# Patient Record
Sex: FEMALE | Race: WHITE | NOT HISPANIC OR LATINO | URBAN - METROPOLITAN AREA
[De-identification: names, ages, dates, MRNs, and addresses within clinical notes are randomized per-mention and may not be internally consistent; named-entity substitution may affect disease eponyms.]

---

## 2024-10-11 ENCOUNTER — INPATIENT (INPATIENT)
Facility: HOSPITAL | Age: 79
LOS: 1 days | Discharge: HOME | DRG: 312 | End: 2024-10-13
Attending: INTERNAL MEDICINE | Admitting: INTERNAL MEDICINE
Payer: MEDICARE

## 2024-10-11 VITALS
WEIGHT: 158.95 LBS | SYSTOLIC BLOOD PRESSURE: 164 MMHG | TEMPERATURE: 98 F | HEART RATE: 70 BPM | OXYGEN SATURATION: 95 % | HEIGHT: 61 IN | RESPIRATION RATE: 16 BRPM | DIASTOLIC BLOOD PRESSURE: 78 MMHG

## 2024-10-11 DIAGNOSIS — R55 SYNCOPE AND COLLAPSE: ICD-10-CM

## 2024-10-11 LAB
ALBUMIN SERPL ELPH-MCNC: 3.5 G/DL — SIGNIFICANT CHANGE UP (ref 3.5–5)
ALP SERPL-CCNC: 41 U/L — SIGNIFICANT CHANGE UP (ref 40–120)
ALT FLD-CCNC: 26 U/L DA — SIGNIFICANT CHANGE UP (ref 10–60)
ANION GAP SERPL CALC-SCNC: 5 MMOL/L — SIGNIFICANT CHANGE UP (ref 5–17)
AST SERPL-CCNC: 30 U/L — SIGNIFICANT CHANGE UP (ref 10–40)
BASOPHILS # BLD AUTO: 0.01 K/UL — SIGNIFICANT CHANGE UP (ref 0–0.2)
BASOPHILS NFR BLD AUTO: 0.2 % — SIGNIFICANT CHANGE UP (ref 0–2)
BILIRUB SERPL-MCNC: 0.3 MG/DL — SIGNIFICANT CHANGE UP (ref 0.2–1.2)
BUN SERPL-MCNC: 28 MG/DL — HIGH (ref 7–18)
CALCIUM SERPL-MCNC: 9.8 MG/DL — SIGNIFICANT CHANGE UP (ref 8.4–10.5)
CHLORIDE SERPL-SCNC: 108 MMOL/L — SIGNIFICANT CHANGE UP (ref 96–108)
CO2 SERPL-SCNC: 28 MMOL/L — SIGNIFICANT CHANGE UP (ref 22–31)
CREAT SERPL-MCNC: 1.54 MG/DL — HIGH (ref 0.5–1.3)
EGFR: 34 ML/MIN/1.73M2 — LOW
EOSINOPHIL # BLD AUTO: 0.2 K/UL — SIGNIFICANT CHANGE UP (ref 0–0.5)
EOSINOPHIL NFR BLD AUTO: 3.4 % — SIGNIFICANT CHANGE UP (ref 0–6)
GLUCOSE SERPL-MCNC: 134 MG/DL — HIGH (ref 70–99)
HCT VFR BLD CALC: 36.6 % — SIGNIFICANT CHANGE UP (ref 34.5–45)
HGB BLD-MCNC: 11.8 G/DL — SIGNIFICANT CHANGE UP (ref 11.5–15.5)
IMM GRANULOCYTES NFR BLD AUTO: 0.3 % — SIGNIFICANT CHANGE UP (ref 0–0.9)
LYMPHOCYTES # BLD AUTO: 1.37 K/UL — SIGNIFICANT CHANGE UP (ref 1–3.3)
LYMPHOCYTES # BLD AUTO: 23.3 % — SIGNIFICANT CHANGE UP (ref 13–44)
MCHC RBC-ENTMCNC: 30.6 PG — SIGNIFICANT CHANGE UP (ref 27–34)
MCHC RBC-ENTMCNC: 32.2 GM/DL — SIGNIFICANT CHANGE UP (ref 32–36)
MCV RBC AUTO: 95.1 FL — SIGNIFICANT CHANGE UP (ref 80–100)
MONOCYTES # BLD AUTO: 0.56 K/UL — SIGNIFICANT CHANGE UP (ref 0–0.9)
MONOCYTES NFR BLD AUTO: 9.5 % — SIGNIFICANT CHANGE UP (ref 2–14)
NEUTROPHILS # BLD AUTO: 3.71 K/UL — SIGNIFICANT CHANGE UP (ref 1.8–7.4)
NEUTROPHILS NFR BLD AUTO: 63.3 % — SIGNIFICANT CHANGE UP (ref 43–77)
NRBC # BLD: 0 /100 WBCS — SIGNIFICANT CHANGE UP (ref 0–0)
PLATELET # BLD AUTO: 206 K/UL — SIGNIFICANT CHANGE UP (ref 150–400)
POTASSIUM SERPL-MCNC: 4.6 MMOL/L — SIGNIFICANT CHANGE UP (ref 3.5–5.3)
POTASSIUM SERPL-SCNC: 4.6 MMOL/L — SIGNIFICANT CHANGE UP (ref 3.5–5.3)
PROT SERPL-MCNC: 8.2 G/DL — SIGNIFICANT CHANGE UP (ref 6–8.3)
RBC # BLD: 3.85 M/UL — SIGNIFICANT CHANGE UP (ref 3.8–5.2)
RBC # FLD: 13.5 % — SIGNIFICANT CHANGE UP (ref 10.3–14.5)
SODIUM SERPL-SCNC: 141 MMOL/L — SIGNIFICANT CHANGE UP (ref 135–145)
TROPONIN I, HIGH SENSITIVITY RESULT: 11.5 NG/L — SIGNIFICANT CHANGE UP
WBC # BLD: 5.87 K/UL — SIGNIFICANT CHANGE UP (ref 3.8–10.5)
WBC # FLD AUTO: 5.87 K/UL — SIGNIFICANT CHANGE UP (ref 3.8–10.5)

## 2024-10-11 PROCEDURE — 12011 RPR F/E/E/N/L/M 2.5 CM/<: CPT

## 2024-10-11 PROCEDURE — 99285 EMERGENCY DEPT VISIT HI MDM: CPT | Mod: 25

## 2024-10-11 PROCEDURE — 73110 X-RAY EXAM OF WRIST: CPT | Mod: 26,50

## 2024-10-11 PROCEDURE — 70486 CT MAXILLOFACIAL W/O DYE: CPT | Mod: 26,MC

## 2024-10-11 PROCEDURE — 71045 X-RAY EXAM CHEST 1 VIEW: CPT | Mod: 26

## 2024-10-11 PROCEDURE — 70450 CT HEAD/BRAIN W/O DYE: CPT | Mod: 26,MC

## 2024-10-11 PROCEDURE — 93010 ELECTROCARDIOGRAM REPORT: CPT

## 2024-10-11 PROCEDURE — 99223 1ST HOSP IP/OBS HIGH 75: CPT

## 2024-10-11 NOTE — ED ADULT NURSE NOTE - OBJECTIVE STATEMENT
Patient presents to ED c/o pain and reporting fall on sidewalk that was uneven obtaining a laceration on bridge of her nose. Denies LOC

## 2024-10-11 NOTE — H&P ADULT - PROBLEM SELECTOR PLAN 2
Pt w/ SCr 1.54 on admission  -repeat sCr 1.32   -baseline SCr -unknown  -possibly pre-renal/medication induced from doubling dose of omlesartan  -F/U Urine Lytes, calculate FeNa  -IVF for now, follow BMP daily

## 2024-10-11 NOTE — H&P ADULT - NSHPPHYSICALEXAM_GEN_ALL_CORE
LOS: 1d    VITALS:   T(C): 36.5 (10-12-24 @ 05:20), Max: 36.8 (10-11-24 @ 23:54)  HR: 62 (10-12-24 @ 05:20) (62 - 70)  BP: 145/60 (10-12-24 @ 05:20) (145/60 - 164/78)  RR: 18 (10-12-24 @ 05:20) (16 - 18)  SpO2: 94% (10-12-24 @ 05:20) (94% - 95%)    GENERAL: NAD, lying in bed comfortably  HEAD:  Traumatic to bridge of nose s/p laceration repair with surrounding ecchymosis and edema, Normocephalic  EYES: EOMI, PERRLA, conjunctiva and sclera clear  ENT: Moist mucous membranes, dried blood in b/l nares  NECK: Supple, No JVD  CHEST/LUNG: Clear to auscultation bilaterally; No rales, rhonchi, wheezing, or rubs. Unlabored respirations  HEART: Regular rate and rhythm; No murmurs, rubs, or gallops  ABDOMEN: BSx4; Soft, nontender, nondistended  EXTREMITIES:  2+ Peripheral Pulses, brisk capillary refill. No clubbing, cyanosis, or edema  NERVOUS SYSTEM:  A&Ox3, no focal deficits   SKIN: No rashes or lesions

## 2024-10-11 NOTE — H&P ADULT - ASSESSMENT
78F with PMH arthritis, HTN, DM, GERD, IBS-C, hypthyroid, HLD, and depression presenting after a syncopal episode resulting in facial injury walking down the street. Admitted to telemetry for syncopal work-up.

## 2024-10-11 NOTE — ED PROVIDER NOTE - PROGRESS NOTE DETAILS
ATTG: : CT with no acute findings.  Episode concerning for syncope. will admit to the hospital for further care.

## 2024-10-11 NOTE — ED PROVIDER NOTE - CLINICAL SUMMARY MEDICAL DECISION MAKING FREE TEXT BOX
ATTG: : fall unclear etiology as patient now stating that she did not trip and fall and instead had chest pain over the last 3 days, concern include but not limited to syncope / cardiac / mechanical fall will check ct head, ct max facial, labs, xray chest, xray of wrists, pain medication and re eval. refusing tetanus since she had allergic reaction in the past.

## 2024-10-11 NOTE — H&P ADULT - NSICDXPASTMEDICALHX_GEN_ALL_CORE_FT
PAST MEDICAL HISTORY:  HTN (hypertension)      PAST MEDICAL HISTORY:  Depression, unspecified     Diabetes mellitus     HLD (hyperlipidemia)     HTN (hypertension)     Hypothyroidism

## 2024-10-11 NOTE — ED ADULT NURSE NOTE - NSFALLRISKINTERV_ED_ALL_ED

## 2024-10-11 NOTE — H&P ADULT - PROBLEM SELECTOR PLAN 4
hx of HTN on omlesartan  -patient admits to taking med twice a day without consulting MD  -c/w home med once a day  -monitor BP

## 2024-10-11 NOTE — ED PROVIDER NOTE - OBJECTIVE STATEMENT
78-year-old female with PMHx of HTN, presents to the emergency department for fall with laceration to her nose.  Patient states that she was walking on a flat street and then suddenly fell.  Does not recall exactly what happened.  Denies any pre or post dizziness or chest pain.  She does admit to having some chest pain over the last 3 days. the chest pain resolved prior to the episode.  In the emergency department she complains of pain on both her wrists.  She denies being on any anticoagulation and only takes aspirin daily.
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
## 2024-10-11 NOTE — H&P ADULT - PROBLEM SELECTOR PLAN 1
p/w syncope with collapse,  -likely 2/2 to overuse of home BP med however concern for unstable arrythmia in s/o minimal prodrome  -Troponin WNL  -f/u orthostatic bp  -f/u echo w bubble study  -EKG shows sinus rhythm with 1st degree AVB  -f/u UA   -CT head: negative  -Cardio consult in AM  -PT consult

## 2024-10-11 NOTE — ED PROVIDER NOTE - PHYSICAL EXAMINATION
Primary Survey   A - airway intact  B - bilateral breath sounds and good chest rise  C -palpable pulses in all extremities  D - GCS Motor:  6/6, Verbal 5 /5, eyes 4/4 total =15  Exposure obtained      Secondary Survey:  Gen:  No respiratory Distress  /no distress from pain  HEENT: pupils 3 mm reactive to light equally,   EOMI  NO Raccoon Eyes/ Bhandari Sign/ Neck: C- spine non tender. no step off or deformity. tm clear. abrasion / laceration to nasal bridge approx 1.5 cm. with edema. no intranasal bleeding or hematoma  Lungs: breath sounds:  b/l   CVS: S1S2,    Distal Pulses: 2+ Radial and DP b/l   Abd: soft non tender no distention  Extremities: no gross deformity. ttp over both wrists.  no ecchymosis. full range of motion. sensation intact.   MSK: strength: 5/5 b/l upper and lower ext, moving all ext spontaneously  Back: no midline tend or step off  Neuro: aaox3 no foal deficits.

## 2024-10-11 NOTE — H&P ADULT - PROBLEM SELECTOR PLAN 3
p/w laceration to bridge of nose and evidence of epistaxis  -s/p laceration repair in ED  -Hbg stable (11.1)  -CT head/maxillofacial: unremarkable  -Local wound care  -Pain control: Tylenol 1g q8 standing p/w laceration to bridge of nose and evidence of epistaxis  -s/p laceration repair in ED  -Hbg stable (11.1)  -CT head/maxillofacial: unremarkable  -Local wound care  -Pain control: Tylenol 1g q8 standing  -PT consult

## 2024-10-11 NOTE — H&P ADULT - ATTENDING COMMENTS
Vital Signs Last 24 Hrs  T(C): 36.5 (12 Oct 2024 05:20), Max: 36.8 (11 Oct 2024 23:54)  T(F): 97.7 (12 Oct 2024 05:20), Max: 98.2 (11 Oct 2024 23:54)  HR: 62 (12 Oct 2024 05:20) (62 - 70)  BP: 145/60 (12 Oct 2024 05:20) (145/60 - 164/78)  RR: 18 (12 Oct 2024 05:20) (16 - 18)  SpO2: 94% (12 Oct 2024 05:20) (94% - 95%)  Parameters below as of 12 Oct 2024 05:20  Patient On (Oxygen Delivery Method): room air    Orthostatic VS    10-12-24 @ 02:50  Standing BP:   Orthostatic BP (Standing Systolic): 132/Orthostatic BP (Standing Diastolic (mm Hg)): 80 HR: Orthostatic Pulse (Heart Rate (beats/min)): 74  Site: Orthostatic BP/Pulse (Site): upper left arm   Mode: Orthostatic BP/ Pulse (Mode): electronic    10-12-24 @ 02:45  Sitting BP:   Orthostatic BP (Sitting Systolic): 138/Orthostatic BP (Sitting Diastolic (mm Hg)): 72 HR: Orthostatic Pulse (Heart Rate (beats/min)): 68  Standing BP: --/-- HR: --  Site: Orthostatic BP/Pulse (Site): upper left arm   Mode: Orthostatic BP/ Pulse (Mode): electronic    10-12-24 @ 02:40  Lying BP:   Orthostatic BP (Lying Systolic): 159/Orthostatic BP (Lying Diastolic (mm Hg)): 72 HR: Orthostatic Pulse (Heart Rate (beats/min)): 71   Site: Orthostatic BP/Pulse (Site): upper left arm   Mode: Orthostatic BP/ Pulse (Mode): electronic      Labs   unremarkable except  BUN/Cr - 28/1.54 --> 23/1.32    CXR unremarkable    CT head/ CT MF   - unremarkable     Impression   78 year old lady with hx of HTN brought to the ED after a fall during which she fell on her face. She does not recollect the event preceding the fall and cannot rule out loss of consciousness.   OF note, preceding hx of intermittent dizziness and left chest pain/pressure. No work up before now.  Orthostatic V/S are +ve   Will admit for syncope and ACS work up     Plan   Admit to telemetry   gentle IVF hydration and repeat orthostatic VS post hydration  Serial trop; EKG only shows 1st degree AVB  Head CT as above   Obtain ECHO this AM   Cardiology consult   Consider stress test  repeat chem after hydration  PT evaluation  Plans for chronic med problems as above  med reconciliation and resume home meds

## 2024-10-11 NOTE — ED PROVIDER NOTE - NS ED ATTENDING STATEMENT MOD
How Severe Are Your Spot(S)?: mild Have Your Spot(S) Been Treated In The Past?: has not been treated Hpi Title: Evaluation of Skin Lesions Attending Only

## 2024-10-12 DIAGNOSIS — E78.5 HYPERLIPIDEMIA, UNSPECIFIED: ICD-10-CM

## 2024-10-12 DIAGNOSIS — R55 SYNCOPE AND COLLAPSE: ICD-10-CM

## 2024-10-12 DIAGNOSIS — Z29.9 ENCOUNTER FOR PROPHYLACTIC MEASURES, UNSPECIFIED: ICD-10-CM

## 2024-10-12 DIAGNOSIS — I10 ESSENTIAL (PRIMARY) HYPERTENSION: ICD-10-CM

## 2024-10-12 DIAGNOSIS — E11.9 TYPE 2 DIABETES MELLITUS WITHOUT COMPLICATIONS: ICD-10-CM

## 2024-10-12 DIAGNOSIS — N17.9 ACUTE KIDNEY FAILURE, UNSPECIFIED: ICD-10-CM

## 2024-10-12 DIAGNOSIS — M19.90 UNSPECIFIED OSTEOARTHRITIS, UNSPECIFIED SITE: ICD-10-CM
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
## 2024-10-12 DIAGNOSIS — E03.9 HYPOTHYROIDISM, UNSPECIFIED: ICD-10-CM

## 2024-10-12 LAB
ANION GAP SERPL CALC-SCNC: 7 MMOL/L — SIGNIFICANT CHANGE UP (ref 5–17)
APPEARANCE UR: CLEAR — SIGNIFICANT CHANGE UP
BACTERIA # UR AUTO: ABNORMAL /HPF
BILIRUB UR-MCNC: NEGATIVE — SIGNIFICANT CHANGE UP
BUN SERPL-MCNC: 23 MG/DL — HIGH (ref 7–18)
CALCIUM SERPL-MCNC: 9.3 MG/DL — SIGNIFICANT CHANGE UP (ref 8.4–10.5)
CHLORIDE SERPL-SCNC: 111 MMOL/L — HIGH (ref 96–108)
CO2 SERPL-SCNC: 28 MMOL/L — SIGNIFICANT CHANGE UP (ref 22–31)
COLOR SPEC: YELLOW — SIGNIFICANT CHANGE UP
COMMENT - URINE: SIGNIFICANT CHANGE UP
CREAT ?TM UR-MCNC: 33 MG/DL — SIGNIFICANT CHANGE UP
CREAT SERPL-MCNC: 1.32 MG/DL — HIGH (ref 0.5–1.3)
DIFF PNL FLD: NEGATIVE — SIGNIFICANT CHANGE UP
EGFR: 41 ML/MIN/1.73M2 — LOW
EPI CELLS # UR: PRESENT
GLUCOSE BLDC GLUCOMTR-MCNC: 101 MG/DL — HIGH (ref 70–99)
GLUCOSE BLDC GLUCOMTR-MCNC: 116 MG/DL — HIGH (ref 70–99)
GLUCOSE BLDC GLUCOMTR-MCNC: 89 MG/DL — SIGNIFICANT CHANGE UP (ref 70–99)
GLUCOSE BLDC GLUCOMTR-MCNC: 98 MG/DL — SIGNIFICANT CHANGE UP (ref 70–99)
GLUCOSE SERPL-MCNC: 96 MG/DL — SIGNIFICANT CHANGE UP (ref 70–99)
GLUCOSE UR QL: NEGATIVE MG/DL — SIGNIFICANT CHANGE UP
HCT VFR BLD CALC: 34.1 % — LOW (ref 34.5–45)
HGB BLD-MCNC: 11.1 G/DL — LOW (ref 11.5–15.5)
KETONES UR-MCNC: NEGATIVE MG/DL — SIGNIFICANT CHANGE UP
LEUKOCYTE ESTERASE UR-ACNC: NEGATIVE — SIGNIFICANT CHANGE UP
MAGNESIUM SERPL-MCNC: 1.7 MG/DL — SIGNIFICANT CHANGE UP (ref 1.6–2.6)
MCHC RBC-ENTMCNC: 31 PG — SIGNIFICANT CHANGE UP (ref 27–34)
MCHC RBC-ENTMCNC: 32.6 GM/DL — SIGNIFICANT CHANGE UP (ref 32–36)
MCV RBC AUTO: 95.3 FL — SIGNIFICANT CHANGE UP (ref 80–100)
NITRITE UR-MCNC: NEGATIVE — SIGNIFICANT CHANGE UP
NRBC # BLD: 0 /100 WBCS — SIGNIFICANT CHANGE UP (ref 0–0)
PH UR: 6 — SIGNIFICANT CHANGE UP (ref 5–8)
PHOSPHATE SERPL-MCNC: 2.9 MG/DL — SIGNIFICANT CHANGE UP (ref 2.5–4.5)
PLATELET # BLD AUTO: 191 K/UL — SIGNIFICANT CHANGE UP (ref 150–400)
POTASSIUM SERPL-MCNC: 4.2 MMOL/L — SIGNIFICANT CHANGE UP (ref 3.5–5.3)
POTASSIUM SERPL-SCNC: 4.2 MMOL/L — SIGNIFICANT CHANGE UP (ref 3.5–5.3)
PROT ?TM UR-MCNC: 20 MG/DL — HIGH (ref 0–12)
PROT UR-MCNC: ABNORMAL MG/DL
PROT/CREAT UR-RTO: 0.6 RATIO — HIGH (ref 0–0.2)
RBC # BLD: 3.58 M/UL — LOW (ref 3.8–5.2)
RBC # FLD: 13.4 % — SIGNIFICANT CHANGE UP (ref 10.3–14.5)
RBC CASTS # UR COMP ASSIST: 1 /HPF — SIGNIFICANT CHANGE UP (ref 0–4)
SODIUM SERPL-SCNC: 146 MMOL/L — HIGH (ref 135–145)
SODIUM UR-SCNC: 111 MMOL/L — SIGNIFICANT CHANGE UP
SP GR SPEC: 1.01 — SIGNIFICANT CHANGE UP (ref 1–1.03)
UROBILINOGEN FLD QL: 0.2 MG/DL — SIGNIFICANT CHANGE UP (ref 0.2–1)
UUN UR-MCNC: 270 MG/DL — SIGNIFICANT CHANGE UP
WBC # BLD: 5.99 K/UL — SIGNIFICANT CHANGE UP (ref 3.8–10.5)
WBC # FLD AUTO: 5.99 K/UL — SIGNIFICANT CHANGE UP (ref 3.8–10.5)
WBC UR QL: 0 /HPF — SIGNIFICANT CHANGE UP (ref 0–5)

## 2024-10-12 PROCEDURE — 99232 SBSQ HOSP IP/OBS MODERATE 35: CPT | Mod: GC

## 2024-10-12 RX ORDER — INSULIN LISPRO 100/ML
VIAL (ML) SUBCUTANEOUS AT BEDTIME
Refills: 0 | Status: DISCONTINUED | OUTPATIENT
Start: 2024-10-12 | End: 2024-10-13

## 2024-10-12 RX ORDER — SODIUM CHLORIDE 0.9 % (FLUSH) 0.9 %
1000 SYRINGE (ML) INJECTION
Refills: 0 | Status: DISCONTINUED | OUTPATIENT
Start: 2024-10-12 | End: 2024-10-13

## 2024-10-12 RX ORDER — INFLUENZA VIRUS VACCINE 15; 15; 15; 15 UG/.5ML; UG/.5ML; UG/.5ML; UG/.5ML
0.5 SUSPENSION INTRAMUSCULAR ONCE
Refills: 0 | Status: COMPLETED | OUTPATIENT
Start: 2024-10-12 | End: 2024-10-12

## 2024-10-12 RX ORDER — ACETAMINOPHEN 325 MG
650 TABLET ORAL EVERY 6 HOURS
Refills: 0 | Status: DISCONTINUED | OUTPATIENT
Start: 2024-10-12 | End: 2024-10-12

## 2024-10-12 RX ORDER — ROSUVASTATIN CALCIUM 20 MG/1
20 TABLET, COATED ORAL AT BEDTIME
Refills: 0 | Status: DISCONTINUED | OUTPATIENT
Start: 2024-10-12 | End: 2024-10-13

## 2024-10-12 RX ORDER — ACETAMINOPHEN 325 MG
650 TABLET ORAL ONCE
Refills: 0 | Status: COMPLETED | OUTPATIENT
Start: 2024-10-12 | End: 2024-10-12

## 2024-10-12 RX ORDER — ACETAMINOPHEN 325 MG
1000 TABLET ORAL EVERY 8 HOURS
Refills: 0 | Status: DISCONTINUED | OUTPATIENT
Start: 2024-10-12 | End: 2024-10-13

## 2024-10-12 RX ORDER — INSULIN LISPRO 100/ML
VIAL (ML) SUBCUTANEOUS
Refills: 0 | Status: DISCONTINUED | OUTPATIENT
Start: 2024-10-12 | End: 2024-10-13

## 2024-10-12 RX ORDER — DULOXETINE HCL 20 MG
30 CAPSULE,DELAYED RELEASE (ENTERIC COATED) ORAL DAILY
Refills: 0 | Status: DISCONTINUED | OUTPATIENT
Start: 2024-10-12 | End: 2024-10-13

## 2024-10-12 RX ADMIN — Medication 30 MILLIGRAM(S): at 12:15

## 2024-10-12 RX ADMIN — ROSUVASTATIN CALCIUM 20 MILLIGRAM(S): 20 TABLET, COATED ORAL at 22:46

## 2024-10-12 RX ADMIN — Medication 650 MILLIGRAM(S): at 09:13

## 2024-10-12 RX ADMIN — Medication 100 MILLILITER(S): at 06:51

## 2024-10-12 RX ADMIN — Medication 1000 MILLIGRAM(S): at 22:44

## 2024-10-12 RX ADMIN — Medication 650 MILLIGRAM(S): at 10:00

## 2024-10-12 RX ADMIN — Medication 1000 MILLIGRAM(S): at 23:44

## 2024-10-12 NOTE — PATIENT PROFILE ADULT - FALL HARM RISK - HARM RISK INTERVENTIONS
Assistance with ambulation/Assistance OOB with selected safe patient handling equipment/Communicate Risk of Fall with Harm to all staff/Discuss with provider need for PT consult/Monitor gait and stability/Provide patient with walking aids - walker, cane, crutches/Reinforce activity limits and safety measures with patient and family/Sit up slowly, dangle for a short time, stand at bedside before walking/Tailored Fall Risk Interventions/Visual Cue: Yellow wristband and red socks/Bed in lowest position, wheels locked, appropriate side rails in place/Call bell, personal items and telephone in reach/Instruct patient to call for assistance before getting out of bed or chair/Non-slip footwear when patient is out of bed/[place] to call system/Physically safe environment - no spills, clutter or unnecessary equipment/Purposeful Proactive Rounding/Room/bathroom lighting operational, light cord in reach

## 2024-10-12 NOTE — CHART NOTE - NSCHARTNOTEFT_GEN_A_CORE
Vital Signs Last 24 Hrs  T(C): 36.5 (12 Oct 2024 05:20), Max: 36.8 (11 Oct 2024 23:54)  T(F): 97.7 (12 Oct 2024 05:20), Max: 98.2 (11 Oct 2024 23:54)  HR: 62 (12 Oct 2024 05:20) (62 - 70)  BP: 145/60 (12 Oct 2024 05:20) (145/60 - 164/78)  RR: 18 (12 Oct 2024 05:20) (16 - 18)  SpO2: 94% (12 Oct 2024 05:20) (94% - 95%)  Parameters below as of 12 Oct 2024 05:20  Patient On (Oxygen Delivery Method): room air    Orthostatic VS    10-12-24 @ 02:50  Standing BP:   Orthostatic BP (Standing Systolic): 132/Orthostatic BP (Standing Diastolic (mm Hg)): 80 HR: Orthostatic Pulse (Heart Rate (beats/min)): 74  Site: Orthostatic BP/Pulse (Site): upper left arm   Mode: Orthostatic BP/ Pulse (Mode): electronic    10-12-24 @ 02:45  Sitting BP:   Orthostatic BP (Sitting Systolic): 138/Orthostatic BP (Sitting Diastolic (mm Hg)): 72 HR: Orthostatic Pulse (Heart Rate (beats/min)): 68  Standing BP: --/-- HR: --  Site: Orthostatic BP/Pulse (Site): upper left arm   Mode: Orthostatic BP/ Pulse (Mode): electronic    10-12-24 @ 02:40  Lying BP:   Orthostatic BP (Lying Systolic): 159/Orthostatic BP (Lying Diastolic (mm Hg)): 72 HR: Orthostatic Pulse (Heart Rate (beats/min)): 71   Site: Orthostatic BP/Pulse (Site): upper left arm   Mode: Orthostatic BP/ Pulse (Mode): electronic      Labs   unremarkable except  BUN/Cr - 28/1.54 --> 23/1.32    CXR unremarkable    CT head/ CT MF   - unremarkable     Impression   78 year old lady with hx of HTN brought to the ED after a fall during which she fell on her face. She does not recollect the event preceding the fall and cannot rule out loss of consciousness.   OF note, preceding hx of intermittent dizziness and left chest pain/pressure. No work up before now. Vital Signs Last 24 Hrs  T(C): 36.5 (12 Oct 2024 05:20), Max: 36.8 (11 Oct 2024 23:54)  T(F): 97.7 (12 Oct 2024 05:20), Max: 98.2 (11 Oct 2024 23:54)  HR: 62 (12 Oct 2024 05:20) (62 - 70)  BP: 145/60 (12 Oct 2024 05:20) (145/60 - 164/78)  RR: 18 (12 Oct 2024 05:20) (16 - 18)  SpO2: 94% (12 Oct 2024 05:20) (94% - 95%)  Parameters below as of 12 Oct 2024 05:20  Patient On (Oxygen Delivery Method): room air    Orthostatic VS    10-12-24 @ 02:50  Standing BP:   Orthostatic BP (Standing Systolic): 132/Orthostatic BP (Standing Diastolic (mm Hg)): 80 HR: Orthostatic Pulse (Heart Rate (beats/min)): 74  Site: Orthostatic BP/Pulse (Site): upper left arm   Mode: Orthostatic BP/ Pulse (Mode): electronic    10-12-24 @ 02:45  Sitting BP:   Orthostatic BP (Sitting Systolic): 138/Orthostatic BP (Sitting Diastolic (mm Hg)): 72 HR: Orthostatic Pulse (Heart Rate (beats/min)): 68  Standing BP: --/-- HR: --  Site: Orthostatic BP/Pulse (Site): upper left arm   Mode: Orthostatic BP/ Pulse (Mode): electronic    10-12-24 @ 02:40  Lying BP:   Orthostatic BP (Lying Systolic): 159/Orthostatic BP (Lying Diastolic (mm Hg)): 72 HR: Orthostatic Pulse (Heart Rate (beats/min)): 71   Site: Orthostatic BP/Pulse (Site): upper left arm   Mode: Orthostatic BP/ Pulse (Mode): electronic      Labs   unremarkable except  BUN/Cr - 28/1.54 --> 23/1.32    CXR unremarkable    CT head/ CT MF   - unremarkable     Impression   78 year old lady with hx of HTN brought to the ED after a fall during which she fell on her face. She does not recollect the event preceding the fall and cannot rule out loss of consciousness.   OF note, preceding hx of intermittent dizziness and left chest pain/pressure. No work up before now.  Orthostatic V/S are +ve   Will admit for syncope and ACS work up     Plan   Admit to telemetry   gentle IVF hydration and repeat orthostatic VS post hydration  Serial trop; EKG only shows 1st degree AVB  Head CT as above   Obtain ECHO this AM   Cardiology consult   Consider stress test  repeat chem after hydration  PT evaluation  Plans for chronic med problems as above  med reconciliation and resume home meds

## 2024-10-12 NOTE — PROGRESS NOTE ADULT - PROBLEM SELECTOR PLAN 1
p/w syncope with collapse,  -likely 2/2 to overuse of home BP med however concern for unstable arrythmia in s/o minimal prodrome  -Troponin WNL  -f/u orthostatic bp  -f/u echo w bubble study  -EKG shows sinus rhythm with 1st degree AVB  -f/u UA   -CT head: negative  -Cardio consult in AM - Dr. Ag  -PT consult

## 2024-10-12 NOTE — PROGRESS NOTE ADULT - SUBJECTIVE AND OBJECTIVE BOX
PGY-1 Progress Note discussed with attending      PLEASE CONTACT ON CALL TEAM:  - On Call Team (Please refer to Ailyn) FROM 5:00 PM - 8:30PM  - Nightfloat Team FROM 8:30 -7:30 AM    INTERVAL HPI/OVERNIGHT EVENTS: No acute events overnight.  Patient examined at bedside this AM.  Patient denies acute complaints. Denies any current dizziness.       REVIEW OF SYSTEMS:  CONSTITUTIONAL: No fever, weight loss, or fatigue  RESPIRATORY: No cough, wheezing, chills or hemoptysis; No shortness of breath  CARDIOVASCULAR: No chest pain, palpitations, dizziness, or leg swelling  GASTROINTESTINAL: No abdominal pain. No nausea, vomiting, or hematemesis; No diarrhea or constipation. No melena or hematochezia.  GENITOURINARY: No dysuria or hematuria, urinary frequency  NEUROLOGICAL: No headaches, memory loss, loss of strength, numbness, or tremors  SKIN: No itching, burning, rashes, or lesions     MEDICATIONS  (STANDING):  acetaminophen     Tablet .. 1000 milliGRAM(s) Oral every 8 hours  DULoxetine 30 milliGRAM(s) Oral daily  influenza  Vaccine (HIGH DOSE) 0.5 milliLiter(s) IntraMuscular once  insulin lispro (ADMELOG) corrective regimen sliding scale   SubCutaneous at bedtime  insulin lispro (ADMELOG) corrective regimen sliding scale   SubCutaneous three times a day before meals  levothyroxine 50 MICROGram(s) Oral daily  rosuvastatin 20 milliGRAM(s) Oral at bedtime  sodium chloride 0.9%. 1000 milliLiter(s) (100 mL/Hr) IV Continuous <Continuous>    MEDICATIONS  (PRN):      Vital Signs Last 24 Hrs  T(C): 36.6 (12 Oct 2024 11:12), Max: 36.8 (11 Oct 2024 23:54)  T(F): 97.9 (12 Oct 2024 11:12), Max: 98.2 (11 Oct 2024 23:54)  HR: 60 (12 Oct 2024 11:12) (60 - 70)  BP: 147/65 (12 Oct 2024 11:12) (139/65 - 164/78)  BP(mean): --  RR: 18 (12 Oct 2024 11:12) (16 - 18)  SpO2: 94% (12 Oct 2024 11:12) (93% - 95%)    Parameters below as of 12 Oct 2024 11:12  Patient On (Oxygen Delivery Method): room air        PHYSICAL EXAMINATION:  GENERAL: NAD  HEAD:  Atraumatic, Normocephalic  EYES:  conjunctiva and sclera clear  NECK: Supple, No JVD, Normal thyroid  CHEST/LUNG: Clear to auscultation. Clear to percussion bilaterally; No rales, rhonchi, wheezing, or rubs  HEART: Regular rate and rhythm; No murmurs, rubs, or gallops  ABDOMEN: Soft, Nontender, Nondistended; Bowel sounds present, no pain or masses on palpation  NERVOUS SYSTEM:  Alert & Oriented X3  EXTREMITIES:  2+ Peripheral Pulses, No clubbing, cyanosis, or edema  SKIN: warm dry                          11.1   5.99  )-----------( 191      ( 12 Oct 2024 05:05 )             34.1     10-12    146[H]  |  111[H]  |  23[H]  ----------------------------<  96  4.2   |  28  |  1.32[H]    Ca    9.3      12 Oct 2024 05:05  Phos  2.9     10-12  Mg     1.7     10-12    TPro  8.2  /  Alb  3.5  /  TBili  0.3  /  DBili  x   /  AST  30  /  ALT  26  /  AlkPhos  41  10-11    LIVER FUNCTIONS - ( 11 Oct 2024 19:40 )  Alb: 3.5 g/dL / Pro: 8.2 g/dL / ALK PHOS: 41 U/L / ALT: 26 U/L DA / AST: 30 U/L / GGT: x                   I&O's Summary        Urinalysis with Rflx Culture (collected 12 Oct 2024 07:56)        CAPILLARY BLOOD GLUCOSE      RADIOLOGY & ADDITIONAL TESTS:

## 2024-10-12 NOTE — PROGRESS NOTE ADULT - PROBLEM SELECTOR PLAN 3
p/w laceration to bridge of nose and evidence of epistaxis  -s/p laceration repair in ED  -Hbg stable (11.1)  -CT head/maxillofacial: unremarkable  -Local wound care  -Pain control: Tylenol 1g q8 standing  -PT consult

## 2024-10-13 VITALS
OXYGEN SATURATION: 94 % | HEART RATE: 57 BPM | DIASTOLIC BLOOD PRESSURE: 67 MMHG | TEMPERATURE: 98 F | RESPIRATION RATE: 18 BRPM | SYSTOLIC BLOOD PRESSURE: 148 MMHG

## 2024-10-13 LAB
A1C WITH ESTIMATED AVERAGE GLUCOSE RESULT: 5.8 % — HIGH (ref 4–5.6)
ANION GAP SERPL CALC-SCNC: 4 MMOL/L — LOW (ref 5–17)
BUN SERPL-MCNC: 20 MG/DL — HIGH (ref 7–18)
CALCIUM SERPL-MCNC: 8.9 MG/DL — SIGNIFICANT CHANGE UP (ref 8.4–10.5)
CHLORIDE SERPL-SCNC: 112 MMOL/L — HIGH (ref 96–108)
CO2 SERPL-SCNC: 28 MMOL/L — SIGNIFICANT CHANGE UP (ref 22–31)
CREAT SERPL-MCNC: 1.22 MG/DL — SIGNIFICANT CHANGE UP (ref 0.5–1.3)
EGFR: 45 ML/MIN/1.73M2 — LOW
ESTIMATED AVERAGE GLUCOSE: 120 MG/DL — HIGH (ref 68–114)
GLUCOSE BLDC GLUCOMTR-MCNC: 81 MG/DL — SIGNIFICANT CHANGE UP (ref 70–99)
GLUCOSE BLDC GLUCOMTR-MCNC: 85 MG/DL — SIGNIFICANT CHANGE UP (ref 70–99)
GLUCOSE SERPL-MCNC: 94 MG/DL — SIGNIFICANT CHANGE UP (ref 70–99)
HCT VFR BLD CALC: 33.4 % — LOW (ref 34.5–45)
HGB BLD-MCNC: 10.8 G/DL — LOW (ref 11.5–15.5)
MAGNESIUM SERPL-MCNC: 1.9 MG/DL — SIGNIFICANT CHANGE UP (ref 1.6–2.6)
MCHC RBC-ENTMCNC: 31 PG — SIGNIFICANT CHANGE UP (ref 27–34)
MCHC RBC-ENTMCNC: 32.3 GM/DL — SIGNIFICANT CHANGE UP (ref 32–36)
MCV RBC AUTO: 96 FL — SIGNIFICANT CHANGE UP (ref 80–100)
NRBC # BLD: 0 /100 WBCS — SIGNIFICANT CHANGE UP (ref 0–0)
PHOSPHATE SERPL-MCNC: 3.2 MG/DL — SIGNIFICANT CHANGE UP (ref 2.5–4.5)
PLATELET # BLD AUTO: 180 K/UL — SIGNIFICANT CHANGE UP (ref 150–400)
POTASSIUM SERPL-MCNC: 4 MMOL/L — SIGNIFICANT CHANGE UP (ref 3.5–5.3)
POTASSIUM SERPL-SCNC: 4 MMOL/L — SIGNIFICANT CHANGE UP (ref 3.5–5.3)
RBC # BLD: 3.48 M/UL — LOW (ref 3.8–5.2)
RBC # FLD: 13.5 % — SIGNIFICANT CHANGE UP (ref 10.3–14.5)
SODIUM SERPL-SCNC: 144 MMOL/L — SIGNIFICANT CHANGE UP (ref 135–145)
WBC # BLD: 4.66 K/UL — SIGNIFICANT CHANGE UP (ref 3.8–10.5)
WBC # FLD AUTO: 4.66 K/UL — SIGNIFICANT CHANGE UP (ref 3.8–10.5)

## 2024-10-13 PROCEDURE — 99239 HOSP IP/OBS DSCHRG MGMT >30: CPT | Mod: GC

## 2024-10-13 RX ORDER — AMLODIPINE BESYLATE 5 MG
1 TABLET ORAL
Qty: 30 | Refills: 0
Start: 2024-10-13

## 2024-10-13 RX ORDER — DICLOFENAC SODIUM 10 MG/G
0 GEL TOPICAL
Qty: 0 | Refills: 0 | DISCHARGE

## 2024-10-13 RX ORDER — DICLOFENAC SODIUM 10 MG/G
1 GEL TOPICAL
Qty: 1 | Refills: 0
Start: 2024-10-13

## 2024-10-13 RX ORDER — DICLOFENAC SODIUM 10 MG/G
40 GEL TOPICAL
Qty: 1 | Refills: 0
Start: 2024-10-13

## 2024-10-13 RX ORDER — DICLOFENAC SODIUM 75 MG
0 TABLET, DELAYED RELEASE (ENTERIC COATED) ORAL
Qty: 0 | Refills: 0 | DISCHARGE

## 2024-10-13 RX ORDER — MEMANTINE HYDROCHLORIDE AND DONEPEZIL HYDROCHLORIDE 7; 10 MG/1; MG/1
0 CAPSULE ORAL
Qty: 0 | Refills: 0 | DISCHARGE

## 2024-10-13 RX ORDER — LINACLOTIDE 72 UG/1
1 CAPSULE, GELATIN COATED ORAL
Qty: 30 | Refills: 0
Start: 2024-10-13

## 2024-10-13 RX ORDER — MEMANTINE HYDROCHLORIDE AND DONEPEZIL HYDROCHLORIDE 7; 10 MG/1; MG/1
1 CAPSULE ORAL
Qty: 30 | Refills: 0
Start: 2024-10-13

## 2024-10-13 RX ORDER — ROSUVASTATIN CALCIUM 20 MG/1
1 TABLET, COATED ORAL
Qty: 30 | Refills: 0
Start: 2024-10-13

## 2024-10-13 RX ORDER — FAMOTIDINE 40 MG
0 TABLET ORAL
Qty: 0 | Refills: 0 | DISCHARGE

## 2024-10-13 RX ORDER — OLMESARTAN MEDOXOMIL 40 MG/1
1 TABLET ORAL
Qty: 30 | Refills: 0
Start: 2024-10-13 | End: 2024-11-11

## 2024-10-13 RX ORDER — ROSUVASTATIN CALCIUM 20 MG/1
0 TABLET, COATED ORAL
Qty: 0 | Refills: 0 | DISCHARGE

## 2024-10-13 RX ORDER — EZETIMIBE 10 MG/1
1 TABLET ORAL
Qty: 0 | Refills: 0 | DISCHARGE

## 2024-10-13 RX ORDER — OLMESARTAN MEDOXOMIL 40 MG/1
1 TABLET ORAL
Qty: 30 | Refills: 0
Start: 2024-10-13

## 2024-10-13 RX ORDER — EZETIMIBE 10 MG/1
10 TABLET ORAL DAILY
Refills: 0 | Status: DISCONTINUED | OUTPATIENT
Start: 2024-10-13 | End: 2024-10-13

## 2024-10-13 RX ORDER — OLMESARTAN MEDOXOMIL 40 MG/1
0 TABLET ORAL
Qty: 0 | Refills: 0 | DISCHARGE

## 2024-10-13 RX ORDER — BREXPIPRAZOLE 2 MG/1
1 TABLET ORAL
Qty: 30 | Refills: 0
Start: 2024-10-13

## 2024-10-13 RX ORDER — AMLODIPINE BESYLATE 5 MG
0 TABLET ORAL
Qty: 0 | Refills: 0 | DISCHARGE

## 2024-10-13 RX ORDER — LINACLOTIDE 72 UG/1
0 CAPSULE, GELATIN COATED ORAL
Qty: 0 | Refills: 0 | DISCHARGE

## 2024-10-13 RX ORDER — EZETIMIBE 10 MG/1
1 TABLET ORAL
Qty: 30 | Refills: 0
Start: 2024-10-13

## 2024-10-13 RX ORDER — DICLOFENAC SODIUM 75 MG
1 TABLET, DELAYED RELEASE (ENTERIC COATED) ORAL
Qty: 30 | Refills: 0
Start: 2024-10-13

## 2024-10-13 RX ORDER — FAMOTIDINE 40 MG
1 TABLET ORAL
Qty: 30 | Refills: 0
Start: 2024-10-13

## 2024-10-13 RX ORDER — DULOXETINE HCL 20 MG
1 CAPSULE,DELAYED RELEASE (ENTERIC COATED) ORAL
Qty: 29 | Refills: 0
Start: 2024-10-13 | End: 2024-11-10

## 2024-10-13 RX ORDER — ESTRADIOL 10 UG/1
0 INSERT VAGINAL
Qty: 0 | Refills: 0 | DISCHARGE

## 2024-10-13 RX ORDER — DICLOFENAC SODIUM 75 MG
1 TABLET, DELAYED RELEASE (ENTERIC COATED) ORAL
Qty: 30 | Refills: 0
Start: 2024-10-13 | End: 2024-11-11

## 2024-10-13 RX ORDER — BREXPIPRAZOLE 2 MG/1
0 TABLET ORAL
Qty: 0 | Refills: 0 | DISCHARGE

## 2024-10-13 RX ORDER — FAMOTIDINE 40 MG
20 TABLET ORAL DAILY
Refills: 0 | Status: DISCONTINUED | OUTPATIENT
Start: 2024-10-13 | End: 2024-10-13

## 2024-10-13 RX ORDER — DULOXETINE HCL 20 MG
0 CAPSULE,DELAYED RELEASE (ENTERIC COATED) ORAL
Qty: 0 | Refills: 0 | DISCHARGE

## 2024-10-13 RX ORDER — DULOXETINE HCL 20 MG
1 CAPSULE,DELAYED RELEASE (ENTERIC COATED) ORAL
Qty: 30 | Refills: 0
Start: 2024-10-13

## 2024-10-13 RX ADMIN — Medication 50 MICROGRAM(S): at 05:28

## 2024-10-13 RX ADMIN — Medication 30 MILLIGRAM(S): at 12:48

## 2024-10-13 RX ADMIN — EZETIMIBE 10 MILLIGRAM(S): 10 TABLET ORAL at 14:17

## 2024-10-13 RX ADMIN — Medication 100 MILLILITER(S): at 05:28

## 2024-10-13 RX ADMIN — Medication 1000 MILLIGRAM(S): at 06:28

## 2024-10-13 RX ADMIN — Medication 1000 MILLIGRAM(S): at 05:28

## 2024-10-13 RX ADMIN — Medication 20 MILLIGRAM(S): at 14:17
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
## 2024-10-13 NOTE — DISCHARGE NOTE NURSING/CASE MANAGEMENT/SOCIAL WORK - PATIENT PORTAL LINK FT
You can access the FollowMyHealth Patient Portal offered by [facility] by registering at the following website: [email or web address]. By joining [facility]’s FollowMyHealth portal, you will also be able to view your health information using other applications (apps) compatible with our system.

## 2024-10-13 NOTE — DISCHARGE NOTE PROVIDER - NSDCMRMEDTOKEN_GEN_ALL_CORE_FT
AMLODIPINE 5MG TAB:   DICLOFENAC 2% SOL:   DICLOFENAC 25MG TAB:   DULOXETINE 30MG DR CAP:   ESTRADIOLVAG 0.01% CRE:   EZETIMIBE 10MG TAB:   FAMOTIDINE 40MG TAB:   LINZESS 145MCG CAP:   NAMZARIC 14-10MG CAP:   NITROGLYCERN 0.3MG SUB:   OLMESARTAN 20MG TAB:   REXULTI 0.5MG TAB:   ROSUVASTATIN 20MG TAB:   SYNTHROID 50MCG TAB:    EZETIMIBE 10MG TAB: 1 tab(s) orally once a day   amLODIPine 10 mg oral tablet: 1 tab(s) orally  diclofenac 2% topical solution: Apply topically to affected area  DULoxetine 30 mg oral delayed release capsule: 1 cap(s) orally once a day  ezetimibe 10 mg oral tablet: 1 tab(s) orally once a day  famotidine 40 mg oral tablet: 1 tab(s) orally once a day  Linzess 145 mcg oral capsule: 1 cap(s) orally once a day  Namzaric 14 mg-10 mg oral capsule, extended release: 1 cap(s) orally once a day  Rexulti 0.5 mg oral tablet: 1 tab(s) orally once a day  rosuvastatin 20 mg oral tablet: 1 tab(s) orally once a day (at bedtime)  Synthroid 50 mcg (0.05 mg) oral tablet: 1 tab(s) orally once a day

## 2024-10-13 NOTE — DISCHARGE NOTE PROVIDER - CARE PROVIDER_API CALL
Mauricio Toussaint [facility]  Internal Medicine  [address], [address]  [place] [address]  Phone: [phone]  Fax: [phone]  Follow Up Time: 1 week

## 2024-10-13 NOTE — PROGRESS NOTE ADULT - ATTENDING COMMENTS
78 year old lady with hx of HTN brought to the ED after a fall during which she fell on her face. She does not recollect the event preceding the fall and cannot rule out loss of consciousness. Patient has a laceration on nose, which required sutures.   OF note, preceding hx of intermittent dizziness and left chest pain/pressure. No work up before now.  Patient took extra BP meds at home because she thought her BP was low.   Lives alone.  Has HHA six hours.     Alert, cooperative woman  Vital Signs Last 24 Hrs  T(C): 37 (12 Oct 2024 16:04), Max: 37 (12 Oct 2024 16:04)  T(F): 98.6 (12 Oct 2024 16:04), Max: 98.6 (12 Oct 2024 16:04)  HR: 60 (12 Oct 2024 16:04) (60 - 66)  BP: 104/66 (12 Oct 2024 16:04) (104/66 - 163/68)  BP(mean): --  RR: 18 (12 Oct 2024 16:04) (17 - 18)  SpO2: 95% (12 Oct 2024 16:04) (93% - 95%)    Parameters below as of 12 Oct 2024 16:04  Patient On (Oxygen Delivery Method): room air    Repeat orthostatic BP is negative  Sutured laceration on bridge of nose  Lungs, clear  Cor, RRR  ABdomen,soft  Neurological, intact                          11.1   5.99  )-----------( 191      ( 12 Oct 2024 05:05 )             34.1   10-12    146[H]  |  111[H]  |  23[H]  ----------------------------<  96  4.2   |  28  |  1.32[H]    Ca    9.3      12 Oct 2024 05:05  Phos  2.9     10-12  Mg     1.7     10-12    TPro  8.2  /  Alb  3.5  /  TBili  0.3  /  DBili  x   /  AST  30  /  ALT  26  /  AlkPhos  41  10-11  < from: Xray Wrist 3 Views, Bilateral (10.11.24 @ 21:26) >      IMPRESSION: Advanced bilateral first CMC joint arthrosis. Moderate left   second and third MCP joint arthrosis. No fracture or dislocation.    < end of copied text >    < from: CT Maxillofacial No Cont (10.11.24 @ 19:58) >      CT brain:  No acute intracranial hemorrhage, brain edema, or mass effect.  No displaced calvarial fracture.    CT maxillofacial bones:  No acute fracture or traumatic subluxation.  No paranasal sinus air-fluid levels  Intraorbital contents unremarkable.  Visualized soft tissues unremarkable.    < end of copied text >    EKG 1st degree A-V block    IMP: syncope.  Patient had orthostatic changes yesterday, possibly from volume loss or from taking excessive dose of antihypertensive.         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           f/u echo
Patient feels well    Vital Signs Last 24 Hrs  T(C): 36.4 (13 Oct 2024 11:43), Max: 37 (12 Oct 2024 16:04)  T(F): 97.5 (13 Oct 2024 11:43), Max: 98.6 (12 Oct 2024 16:04)  HR: 57 (13 Oct 2024 11:43) (57 - 65)  BP: 148/67 (13 Oct 2024 11:43) (104/66 - 152/72)  BP(mean): --  RR: 18 (13 Oct 2024 11:43) (17 - 18)  SpO2: 94% (13 Oct 2024 11:43) (94% - 95%)    Parameters below as of 13 Oct 2024 11:43  Patient On (Oxygen Delivery Method): room air    Cardiologist has reviewed echo and advised discharge home.

## 2024-10-13 NOTE — DISCHARGE NOTE PROVIDER - ATTENDING DISCHARGE PHYSICAL EXAMINATION:
Alert, cooperative woman in NAD  Vital Signs Last 24 Hrs  T(C): 36.4 (13 Oct 2024 11:43), Max: 37 (12 Oct 2024 16:04)  T(F): 97.5 (13 Oct 2024 11:43), Max: 98.6 (12 Oct 2024 16:04)  HR: 57 (13 Oct 2024 11:43) (57 - 65)  BP: 148/67 (13 Oct 2024 11:43) (104/66 - 152/72)  BP(mean): --  RR: 18 (13 Oct 2024 11:43) (17 - 18)  SpO2: 94% (13 Oct 2024 11:43) (94% - 95%)    Parameters below as of 13 Oct 2024 11:43  Patient On (Oxygen Delivery Method): room air  Healing cicatrix on nose, sutures, in place  Lungs, clear  Cor, RRR  Abdomen, soft  Neurological, intact

## 2024-10-13 NOTE — DISCHARGE NOTE PROVIDER - NSDCCPCAREPLAN_GEN_ALL_CORE_FT
PRINCIPAL DISCHARGE DIAGNOSIS  Diagnosis: Syncope  Assessment and Plan of Treatment: You came to the hospital after an episode of syncope and collapse. You were admitted to exclude any neurological or cardiological causes. ECHO and stress test were normal. Your CT head was negative for any acute pathologies. You had syncopoe due to overuse of your home blood pressure medicatins. You are recommended to increase your fluids intake and maintain adequate hydration and do not rise from a seated position too quickly, as this could cause your blood pressure to drop (Sit for 2-3 minutes before standing or walking from lying down position). PLEASE TAKE YOUR BLOOD PRESSURE MEDICATION AS DIRECTED BY YOUR DOCTOR. Please talk to your doctor before increasing the dose or frequency of you medications. You are recommended to follow up with your PCP in 1 week from discharge for further recommendations.      SECONDARY DISCHARGE DIAGNOSES  Diagnosis: Nasal laceration  Assessment and Plan of Treatment: You had a nasal laceration after your fall. Stiches were placed in the ED. Continue to take tylenol for pain if it persists. Please return back to the [facility] department to removed the stitches in 3 days.    Diagnosis: FAB (acute kidney injury)  Assessment and Plan of Treatment: You were diagnosed with Acute kidney injury (FAB). FAB is a sudden decrease in kidney function in which the kidneys suddenly can't filter waste or excess fluid from the body. When FAB happens, the kidneys have trouble removing waste and excess fluids from the body. The waste and fluids build up and become harmful. Your ACE INHIBITOR was held as this medicine can some times exacerbate kidney injury. A marker of your kidney function is your serum Creatinine which elevates when the kidney gets injured. Kidney function usually returns to normal if the cause of FAB is treated quickly. You were treated with intravenous fluid hydration to ease the stress on your kidney's. Your serum creatinine was as high as 1.54 and downtrended to normal level. Talk to your doctor about how much fluid you should drink. Review all of your medicines with your doctor. Do not take any medicines, including non-steroidal anti-inflammatory drugs (NSAIDs), such as ibuprofen (Advil, Motrin) or naproxen (Aleve), unless your doctor says it is safe for you to do so. Make sure that anyone treating you for any health problem knows that you have had FAB. Please follow up with your primary care doctor to discuss further management.    Diagnosis: Fall  Assessment and Plan of Treatment: You came complaining of fall. Your CT head was negative for acute events like trauma or bleed. You were admitted to telemetry unit to rule out any cardiac causes of syncope and collapse. Your tele monitor showed no cardiac arrhythmias, cardiac enzymes were negative for heart attack, echocardiogram showed normal pumping function of the heart and no significant valvular abnormalities. Your fall was most likely due to a low blood pressure and no cardiac origin or neurologic origin was found. You were on pain medication to better control of symptoms.    Diagnosis: HTN (hypertension)  Assessment and Plan of Treatment: You have a history of Hypertension. On this admission, your Blood Pressure medication was held due to hypotension and FAB. Check your blood pressure regularly at home, your blood pressure target is 120-140/80-90. If your BP is elevated over 180/110, please seek urgent medical attention. To care for your blood pressure at home maintain a healthy lifestyle, eat a low salt diet and added sugars, avoid fatty food, try to lose weight, and exercise regularly or stay active as tolerated 30 mins X 3 times per week.  Notify your doctor if you have any of the following symptoms: (dizziness, lightheadedness, blurry vision, headache, chest pain, shortness of breath.) Please continue taking your home medications as prescribed and follow-up with your PCP in 1 week from discharge to adjust medications as needed.    Diagnosis: HLD (hyperlipidemia)  Assessment and Plan of Treatment: You have history of Hyperlipidemia. On this admission you were found to have abnormal high lipid profile.Please take your medication as prescribed. Maintain healthy lifestyle, low fat diet, exercise regularly and check your lipid levels routinely. Please follow up with your PCP in 1 week from discharge.    Diagnosis: DM (diabetes mellitus)  Assessment and Plan of Treatment: You have history of diabetes. Your HbA1c was 5.8 during this admission. You need to continue monitoring your blood sugar levels closely. Please continue to take your medications as prescribed by your doctor. Eat a diet that is low in added starches and sugars. Diabetes is associated with increased risk of many health conditions, including heart attacks, stroke, infections, kidney failure, and blindness. If you are physically able to, exercise at least 3 times a week. Please follow up with your primary care doctor/Endocrinologist within a week of discharge.    Diagnosis: Hypothyroidism  Assessment and Plan of Treatment: You have history of Hypothyroidism which means you do not make enough thyroid hormone. Signs & symptoms of low levels thyroid hormone production are- tiredness, getting cold easily, coarse or thin hair, constipation, shortness of breath, swelling. Please continue to take your home medications and remember it needs to be taken first thing in the morning, on an empty stomach, not to take with any other medications and wait at least 30 minutes to eat. You need to repeat thyroid function test in 4-6 weeks as outpatient and follow up with your PCP.     PRINCIPAL DISCHARGE DIAGNOSIS  Diagnosis: Syncope  Assessment and Plan of Treatment: You came to the hospital after an episode of syncope and collapse. You were admitted to exclude any neurological or cardiological causes. ECHO was normal. Your CT head was negative for any acute pathologies. You had syncopoe due to overuse of your home blood pressure medicatins. You are recommended to increase your fluids intake and maintain adequate hydration and do not rise from a seated position too quickly, as this could cause your blood pressure to drop (Sit for 2-3 minutes before standing or walking from lying down position). PLEASE TAKE YOUR BLOOD PRESSURE MEDICATION AS DIRECTED BY YOUR DOCTOR. Please talk to your doctor before increasing the dose or frequency of you medications. You are recommended to follow up with your PCP in 1 week from discharge for further recommendations.      SECONDARY DISCHARGE DIAGNOSES  Diagnosis: Nasal laceration  Assessment and Plan of Treatment: You had a nasal laceration after your fall. Stiches were placed in the ED. Continue to take tylenol for pain if it persists. Please return back to the [facility] department to removed the stitches in 3 days.    Diagnosis: FAB (acute kidney injury)  Assessment and Plan of Treatment: You were diagnosed with Acute kidney injury (FAB). FAB is a sudden decrease in kidney function in which the kidneys suddenly can't filter waste or excess fluid from the body. When FAB happens, the kidneys have trouble removing waste and excess fluids from the body. The waste and fluids build up and become harmful. Your ACE INHIBITOR was held as this medicine can some times exacerbate kidney injury. A marker of your kidney function is your serum Creatinine which elevates when the kidney gets injured. Kidney function usually returns to normal if the cause of FAB is treated quickly. You were treated with intravenous fluid hydration to ease the stress on your kidney's. Your serum creatinine was as high as 1.54 and downtrended to normal level. Talk to your doctor about how much fluid you should drink. Review all of your medicines with your doctor. Do not take any medicines, including non-steroidal anti-inflammatory drugs (NSAIDs), such as ibuprofen (Advil, Motrin) or naproxen (Aleve), unless your doctor says it is safe for you to do so. Make sure that anyone treating you for any health problem knows that you have had FAB. Please follow up with your primary care doctor to discuss further management.    Diagnosis: Fall  Assessment and Plan of Treatment: You came complaining of fall. Your CT head was negative for acute events like trauma or bleed. You were admitted to telemetry unit to rule out any cardiac causes of syncope and collapse. Your tele monitor showed no cardiac arrhythmias, cardiac enzymes were negative for heart attack, echocardiogram showed normal pumping function of the heart and no significant valvular abnormalities. Your fall was most likely due to a low blood pressure and no cardiac origin or neurologic origin was found. You were on pain medication to better control of symptoms.    Diagnosis: HTN (hypertension)  Assessment and Plan of Treatment: You have a history of Hypertension. On this admission, your Blood Pressure medication was held due to hypotension and FAB. Check your blood pressure regularly at home, your blood pressure target is 120-140/80-90. If your BP is elevated over 180/110, please seek urgent medical attention. To care for your blood pressure at home maintain a healthy lifestyle, eat a low salt diet and added sugars, avoid fatty food, try to lose weight, and exercise regularly or stay active as tolerated 30 mins X 3 times per week. Notify your doctor if you have any of the following symptoms: (dizziness, lightheadedness, blurry vision, headache, chest pain, shortness of breath.) Please continue taking your home medications as prescribed and follow-up with your PCP in 1 week from discharge to adjust medications as needed.    Diagnosis: HLD (hyperlipidemia)  Assessment and Plan of Treatment: You have history of Hyperlipidemia. On this admission you were found to have abnormal high lipid profile.Please take your medication as prescribed. Maintain healthy lifestyle, low fat diet, exercise regularly and check your lipid levels routinely. Please follow up with your PCP in 1 week from discharge.    Diagnosis: DM (diabetes mellitus)  Assessment and Plan of Treatment: You have history of diabetes. Your HbA1c was 5.8 during this admission. You need to continue monitoring your blood sugar levels closely. Please continue to take your medications as prescribed by your doctor. Eat a diet that is low in added starches and sugars. Diabetes is associated with increased risk of many health conditions, including heart attacks, stroke, infections, kidney failure, and blindness. If you are physically able to, exercise at least 3 times a week. Please follow up with your primary care doctor/Endocrinologist within a week of discharge.    Diagnosis: Hypothyroidism  Assessment and Plan of Treatment: You have history of Hypothyroidism which means you do not make enough thyroid hormone. Signs & symptoms of low levels thyroid hormone production are- tiredness, getting cold easily, coarse or thin hair, constipation, shortness of breath, swelling. Please continue to take your home medications and remember it needs to be taken first thing in the morning, on an empty stomach, not to take with any other medications and wait at least 30 minutes to eat. You need to repeat thyroid function test in 4-6 weeks as outpatient and follow up with your PCP.

## 2024-10-13 NOTE — DISCHARGE NOTE PROVIDER - PROVIDER RX CONTACT NUMBER
[phone] “You can access the [facility] Patient Portal, offered by [facility], by registering with the following website: [email or web address]”

## 2024-10-13 NOTE — DISCHARGE NOTE PROVIDER - NSDCFUADDAPPT_GEN_ALL_CORE_FT
APPTS ARE READY TO BE MADE: [X] YES    Best Family or Patient Contact (if needed):    Additional Information about above appointments (if needed):    1: Dr. Toussaint  2: Emergency Department (to remove sutures) APPTS ARE READY TO BE MADE: [X] YES    Best Family or Patient Contact (if needed):    Additional Information about above appointments (if needed):    1: Dr. Toussaint  2: Emergency Department (to remove sutures)      Patient informed us they already have secured a follow up appointment for Internal Medicine with Dr. Smith on 10/22/2024 at 2pm, [address], [address], [place] [address], which is not visible on [employer].

## 2024-10-13 NOTE — DISCHARGE NOTE NURSING/CASE MANAGEMENT/SOCIAL WORK - NSDCFUADDAPPT_GEN_ALL_CORE_FT
APPTS ARE READY TO BE MADE: [X] YES    Best Family or Patient Contact (if needed):    Additional Information about above appointments (if needed):    1: Dr. Toussaint  2: Emergency Department (to remove sutures)

## 2024-10-13 NOTE — PROGRESS NOTE ADULT - PROBLEM SELECTOR PLAN 2
Pt w/ SCr 1.54 on admission  -repeat sCr 1.32   -baseline SCr -unknown  -possibly pre-renal/medication induced from doubling dose of omlesartan  -F/U Urine Lytes, calculate FeNa  -IVF for now, follow BMP daily RESOLVED  SCr 1.54 on admission  repeat SCr 1.32   baseline SCr unknown  possibly pre-renal/medication induced from doubling dose of olmesartan  SCr 1.22 improved

## 2024-10-13 NOTE — PROGRESS NOTE ADULT - PROBLEM SELECTOR PLAN 6
h/o DM on - hold oral dm meds  f/u A1c  start sliding scale  Adjust insulin as indicated  FS ACHS h/o DM on - hold oral dm meds  hba1c 5.8  ISS  - adjust ISS as indicated  - FS ACHS

## 2024-10-13 NOTE — PROGRESS NOTE ADULT - PROBLEM SELECTOR PLAN 3
p/w laceration to bridge of nose and evidence of epistaxis  -s/p laceration repair in ED  -Hbg stable (11.1)  -CT head/maxillofacial: unremarkable  -Local wound care  -Pain control: Tylenol 1g q8 standing  -PT consult p/w laceration to bridge of nose and evidence of epistaxis  s/p laceration repair in ED  CT head/maxillofacial: unremarkable  Hbg stable  - local wound care  - pain control w/ Tylenol 1g q8 standing  - PT consult

## 2024-10-13 NOTE — PROGRESS NOTE ADULT - ASSESSMENT
78F with PMH arthritis, HTN, DM, GERD, IBS-C, hypthyroid, HLD, and depression presenting after a syncopal episode resulting in facial injury walking down the street. Admitted to telemetry for syncopal work-up.  78F with PMH arthritis, HTN, DM, GERD, IBS-C, hypothyroidism HLD, and depression presenting after a syncopal episode resulting in facial injury walking down the street. Admitted to telemetry for syncopal work-up.

## 2024-10-13 NOTE — PROGRESS NOTE ADULT - SUBJECTIVE AND OBJECTIVE BOX
PGY-1 Progress Note discussed with attending    PAGER #: [[phone]] TILL 5:00 PM  PLEASE CONTACT ON CALL TEAM:  - On Call Team (Please refer to Ailyn) FROM 5:00 PM - 8:30PM  - Nightfloat Team FROM 8:30 -7:30 AM    INTERVAL HPI/OVERNIGHT EVENTS:   -     REVIEW OF SYSTEMS:  CONSTITUTIONAL: No fever, weight loss, or fatigue  RESPIRATORY: No cough, wheezing, chills or hemoptysis; No shortness of breath  CARDIOVASCULAR: No chest pain, palpitations, dizziness, or leg swelling  GASTROINTESTINAL: No abdominal pain. No nausea, vomiting, or hematemesis; No diarrhea or constipation. No melena or hematochezia.  GENITOURINARY: No dysuria or hematuria, urinary frequency  NEUROLOGICAL: No headaches, memory loss, loss of strength, numbness, or tremors  SKIN: No itching, burning, rashes, or lesions     MEDICATIONS  (STANDING):  acetaminophen     Tablet .. 1000 milliGRAM(s) Oral every 8 hours  DULoxetine 30 milliGRAM(s) Oral daily  influenza  Vaccine (HIGH DOSE) 0.5 milliLiter(s) IntraMuscular once  insulin lispro (ADMELOG) corrective regimen sliding scale   SubCutaneous three times a day before meals  insulin lispro (ADMELOG) corrective regimen sliding scale   SubCutaneous at bedtime  levothyroxine 50 MICROGram(s) Oral daily  rosuvastatin 20 milliGRAM(s) Oral at bedtime  sodium chloride 0.9%. 1000 milliLiter(s) (100 mL/Hr) IV Continuous <Continuous>    MEDICATIONS  (PRN):      Vital Signs Last 24 Hrs  T(C): 36.5 (13 Oct 2024 08:06), Max: 37 (12 Oct 2024 16:04)  T(F): 97.7 (13 Oct 2024 08:06), Max: 98.6 (12 Oct 2024 16:04)  HR: 63 (13 Oct 2024 08:06) (60 - 65)  BP: 133/70 (13 Oct 2024 08:06) (104/66 - 152/72)  BP(mean): --  RR: 17 (13 Oct 2024 08:06) (17 - 18)  SpO2: 94% (13 Oct 2024 08:06) (94% - 95%)    Parameters below as of 13 Oct 2024 08:06  Patient On (Oxygen Delivery Method): room air        PHYSICAL EXAMINATION:  GENERAL: NAD, lying in bed comfortably. AAOx  NERVOUS SYSTEM: Speech clear. No deficits   HEAD:  Atraumatic, Normocephalic  EYES: EOMI, PERRLA, conjunctiva and sclera clear  ENT: Moist mucous membranes  NECK: Supple, No JVD, normal thyroid  CHEST/LUNG: Clear to auscultation bilaterally; no rales, rhonchi, wheezing, or rubs. No unlabored respirations   HEART: Regular rate and rhythm; No murmurs, rubs, or gallops  ABDOMEN: Bowel sounds present; Soft, nontender, nondistended.   EXTREMITIES:  2+ Peripheral Pulses, brisk capillary refill. No clubbing, cyanosis, or edema  MSK: FROM all 4 extremities, full and equal strength  SKIN: Warm dry. No rashes or lesions                          11.1   5.99  )-----------( 191      ( 12 Oct 2024 05:05 )             34.1     10-12    146[H]  |  111[H]  |  23[H]  ----------------------------<  96  4.2   |  28  |  1.32[H]    Ca    9.3      12 Oct 2024 05:05  Phos  2.9     10-12  Mg     1.7     10-12    TPro  8.2  /  Alb  3.5  /  TBili  0.3  /  DBili  x   /  AST  30  /  ALT  26  /  AlkPhos  41  10-11    LIVER FUNCTIONS - ( 11 Oct 2024 19:40 )  Alb: 3.5 g/dL / Pro: 8.2 g/dL / ALK PHOS: 41 U/L / ALT: 26 U/L DA / AST: 30 U/L / GGT: x                     CAPILLARY BLOOD GLUCOSE      POCT Blood Glucose.: 81 mg/dL (13 Oct 2024 07:40)  POCT Blood Glucose.: 116 mg/dL (12 Oct 2024 21:10)  POCT Blood Glucose.: 101 mg/dL (12 Oct 2024 17:02)  POCT Blood Glucose.: 89 mg/dL (12 Oct 2024 11:28)      RADIOLOGY & ADDITIONAL TESTS:                   PGY-1 Progress Note discussed with attending    PAGER #: [[phone]] TILL 5:00 PM  PLEASE CONTACT ON CALL TEAM:  - On Call Team (Please refer to Ailyn) FROM 5:00 PM - 8:30PM  - Nightfloat Team FROM 8:30 -7:30 AM    INTERVAL HPI/OVERNIGHT EVENTS:   - No acute events overnight.  Patient examined at bedside this AM.  Patient complains of pain in Rt shoulder and tenderness at base of palms after the fall.   [place]  Floresita #[id number]      REVIEW OF SYSTEMS:  CONSTITUTIONAL: No fever, weight loss, or fatigue  RESPIRATORY: No cough, wheezing, chills or hemoptysis; No shortness of breath  CARDIOVASCULAR: No chest pain, palpitations, dizziness, or leg swelling  GASTROINTESTINAL: No abdominal pain. No nausea, vomiting, or hematemesis; No diarrhea or constipation.   GENITOURINARY: No dysuria or hematuria, urinary frequency  NEUROLOGICAL: No headaches, memory loss, loss of strength, numbness, or tremors  EXT: Rt shoulder pain  SKIN: No itching, burning, rashes, or lesions     MEDICATIONS  (STANDING):  acetaminophen     Tablet .. 1000 milliGRAM(s) Oral every 8 hours  DULoxetine 30 milliGRAM(s) Oral daily  influenza  Vaccine (HIGH DOSE) 0.5 milliLiter(s) IntraMuscular once  insulin lispro (ADMELOG) corrective regimen sliding scale   SubCutaneous three times a day before meals  insulin lispro (ADMELOG) corrective regimen sliding scale   SubCutaneous at bedtime  levothyroxine 50 MICROGram(s) Oral daily  rosuvastatin 20 milliGRAM(s) Oral at bedtime  sodium chloride 0.9%. 1000 milliLiter(s) (100 mL/Hr) IV Continuous <Continuous>    MEDICATIONS  (PRN):      Vital Signs Last 24 Hrs  T(C): 36.5 (13 Oct 2024 08:06), Max: 37 (12 Oct 2024 16:04)  T(F): 97.7 (13 Oct 2024 08:06), Max: 98.6 (12 Oct 2024 16:04)  HR: 63 (13 Oct 2024 08:06) (60 - 65)  BP: 133/70 (13 Oct 2024 08:06) (104/66 - 152/72)  BP(mean): --  RR: 17 (13 Oct 2024 08:06) (17 - 18)  SpO2: 94% (13 Oct 2024 08:06) (94% - 95%)    Parameters below as of 13 Oct 2024 08:06  Patient On (Oxygen Delivery Method): room air        PHYSICAL EXAMINATION:  GENERAL: NAD, lying in bed comfortably. AAOx3  NERVOUS SYSTEM: Speech clear. No deficits   HEAD:  Atraumatic, Normocephalic  EYES: PERRLA, conjunctiva and sclera clear  ENT: Moist mucous membranes  NECK: Supple  CHEST/LUNG: Clear to auscultation bilaterally; no rales, rhonchi, wheezing, or rubs. No unlabored respirations   HEART: Regular rate and rhythm; No murmurs, rubs, or gallops  ABDOMEN: Bowel sounds present; Soft, nontender, nondistended.   EXTREMITIES:  2+ Peripheral Pulses, brisk capillary refill. No clubbing, cyanosis, or edema  MSK: FROM all 4 extremities, full and equal strength  SKIN: Warm dry. Dried scab on nose w/ sutures intact.                           11.1   5.99  )-----------( 191      ( 12 Oct 2024 05:05 )             34.1     10-12    146[H]  |  111[H]  |  23[H]  ----------------------------<  96  4.2   |  28  |  1.32[H]    Ca    9.3      12 Oct 2024 05:05  Phos  2.9     10-12  Mg     1.7     10-12    TPro  8.2  /  Alb  3.5  /  TBili  0.3  /  DBili  x   /  AST  30  /  ALT  26  /  AlkPhos  41  10-11    LIVER FUNCTIONS - ( 11 Oct 2024 19:40 )  Alb: 3.5 g/dL / Pro: 8.2 g/dL / ALK PHOS: 41 U/L / ALT: 26 U/L DA / AST: 30 U/L / GGT: x           CAPILLARY BLOOD GLUCOSE    POCT Blood Glucose.: 81 mg/dL (13 Oct 2024 07:40)  POCT Blood Glucose.: 116 mg/dL (12 Oct 2024 21:10)  POCT Blood Glucose.: 101 mg/dL (12 Oct 2024 17:02)  POCT Blood Glucose.: 89 mg/dL (12 Oct 2024 11:28)      RADIOLOGY & ADDITIONAL TESTS:

## 2024-10-13 NOTE — PROGRESS NOTE ADULT - PROBLEM SELECTOR PLAN 4
hx of HTN on omlesartan  -patient admits to taking med twice a day without consulting MD  -c/w home med once a day  -monitor BP hx of HTN on omlesartan  admits to taking med twice a day without consulting MD  - c/w home med qd  - monitor BP

## 2024-10-13 NOTE — PROGRESS NOTE ADULT - PROBLEM SELECTOR PLAN 1
p/w syncope with collapse,  -likely 2/2 to overuse of home BP med however concern for unstable arrythmia in s/o minimal prodrome  -Troponin WNL  -f/u orthostatic bp  -f/u echo w bubble study  -EKG shows sinus rhythm with 1st degree AVB  -f/u UA   -CT head: negative  -Cardio consult in AM - Dr. Ag  -PT consult p/w syncope with collapse,  likely 2/2 to overuse of home BP med however concern for unstable arrythmia in s/o minimal prodrome  troponin WNL  EKG sinus rhythm with 1st degree AVB  UA -ve  CTH neg  cards Dr. Ag following   - f/u echo w/ bubble study  - PT consult

## 2024-10-16 ENCOUNTER — EMERGENCY (EMERGENCY)
Facility: HOSPITAL | Age: 79
LOS: 1 days | Discharge: HOME | End: 2024-10-16
Attending: STUDENT IN AN ORGANIZED HEALTH CARE EDUCATION/TRAINING PROGRAM
Payer: MEDICARE

## 2024-10-16 VITALS
DIASTOLIC BLOOD PRESSURE: 78 MMHG | TEMPERATURE: 98 F | OXYGEN SATURATION: 99 % | WEIGHT: 141.1 LBS | RESPIRATION RATE: 18 BRPM | SYSTOLIC BLOOD PRESSURE: 157 MMHG | HEIGHT: 61 IN | HEART RATE: 63 BPM

## 2024-10-16 PROBLEM — F32.A DEPRESSION, UNSPECIFIED: Chronic | Status: ACTIVE | Noted: 2024-10-12

## 2024-10-16 PROBLEM — E78.5 HYPERLIPIDEMIA, UNSPECIFIED: Chronic | Status: ACTIVE | Noted: 2024-10-12

## 2024-10-16 PROBLEM — I10 ESSENTIAL (PRIMARY) HYPERTENSION: Chronic | Status: ACTIVE | Noted: 2024-10-11

## 2024-10-16 PROBLEM — E11.9 TYPE 2 DIABETES MELLITUS WITHOUT COMPLICATIONS: Chronic | Status: ACTIVE | Noted: 2024-10-12

## 2024-10-16 PROBLEM — E03.9 HYPOTHYROIDISM, UNSPECIFIED: Chronic | Status: ACTIVE | Noted: 2024-10-12

## 2024-10-16 PROCEDURE — G0463: CPT

## 2024-10-16 PROCEDURE — L9995: CPT

## 2024-10-16 NOTE — ED PROVIDER NOTE - NSICDXPASTMEDICALHX_GEN_ALL_CORE_FT
PAST MEDICAL HISTORY:  Depression, unspecified     Diabetes mellitus     HLD (hyperlipidemia)     HTN (hypertension)     Hypothyroidism

## 2024-10-16 NOTE — ED PROVIDER NOTE - PHYSICAL EXAMINATION
Nose with 3 sutures in place.  Small scab.  No dehiscence, erythema, induration or tenderness to palpation.  Small resolving  ecchymosis under both eyes.

## 2024-10-16 NOTE — ED PROVIDER NOTE - CLINICAL SUMMARY MEDICAL DECISION MAKING FREE TEXT BOX
Sutures removed. No signs of infection. No dehiscence. Will discharge with PMD follow up as needed. Wound care after suture removal explained.   Will go home with home attendant.

## 2024-10-16 NOTE — ED PROVIDER NOTE - PATIENT PORTAL LINK FT
You can access the FollowMyHealth Patient Portal offered by [facility] by registering at the following website: [email or web address]. By joining [facility]’s FollowMyHealth portal, you will also be able to view your health information using other applications (apps) compatible with our system.

## 2024-10-16 NOTE — ED PROVIDER NOTE - OBJECTIVE STATEMENT
78-year-old female, presents for suture removal status post laceration repair 5 days ago.  Patient reports feeling much better.  Denies any vision changes or difficult/pain with eye movement. Denies any fever, chills, redness, opening of wound, drainage, bleeding, streaking, numbness, tingling or any  other complaint.

## 2024-10-21 NOTE — CHART NOTE - NSCHARTNOTEFT_GEN_A_CORE
Patient was outreached but did not answer nor could a voicemail be left on [phone] and [phone].

## 2024-11-26 PROCEDURE — 83036 HEMOGLOBIN GLYCOSYLATED A1C: CPT

## 2024-11-26 PROCEDURE — 82962 GLUCOSE BLOOD TEST: CPT

## 2024-11-26 PROCEDURE — 84540 ASSAY OF URINE/UREA-N: CPT

## 2024-11-26 PROCEDURE — 84100 ASSAY OF PHOSPHORUS: CPT

## 2024-11-26 PROCEDURE — 81001 URINALYSIS AUTO W/SCOPE: CPT

## 2024-11-26 PROCEDURE — 73110 X-RAY EXAM OF WRIST: CPT

## 2024-11-26 PROCEDURE — 84300 ASSAY OF URINE SODIUM: CPT

## 2024-11-26 PROCEDURE — 12011 RPR F/E/E/N/L/M 2.5 CM/<: CPT

## 2024-11-26 PROCEDURE — 71045 X-RAY EXAM CHEST 1 VIEW: CPT

## 2024-11-26 PROCEDURE — 82570 ASSAY OF URINE CREATININE: CPT

## 2024-11-26 PROCEDURE — 36415 COLL VENOUS BLD VENIPUNCTURE: CPT

## 2024-11-26 PROCEDURE — 70450 CT HEAD/BRAIN W/O DYE: CPT | Mod: MC

## 2024-11-26 PROCEDURE — 99285 EMERGENCY DEPT VISIT HI MDM: CPT

## 2024-11-26 PROCEDURE — 83735 ASSAY OF MAGNESIUM: CPT

## 2024-11-26 PROCEDURE — 80053 COMPREHEN METABOLIC PANEL: CPT

## 2024-11-26 PROCEDURE — 84156 ASSAY OF PROTEIN URINE: CPT

## 2024-11-26 PROCEDURE — 80048 BASIC METABOLIC PNL TOTAL CA: CPT

## 2024-11-26 PROCEDURE — 93306 TTE W/DOPPLER COMPLETE: CPT

## 2024-11-26 PROCEDURE — 85025 COMPLETE CBC W/AUTO DIFF WBC: CPT

## 2024-11-26 PROCEDURE — 70486 CT MAXILLOFACIAL W/O DYE: CPT | Mod: MC

## 2024-11-26 PROCEDURE — 84484 ASSAY OF TROPONIN QUANT: CPT

## 2024-11-26 PROCEDURE — 85027 COMPLETE CBC AUTOMATED: CPT

## 2024-11-26 PROCEDURE — 93005 ELECTROCARDIOGRAM TRACING: CPT
